# Patient Record
Sex: MALE | Race: ASIAN | NOT HISPANIC OR LATINO | Employment: FULL TIME | URBAN - METROPOLITAN AREA
[De-identification: names, ages, dates, MRNs, and addresses within clinical notes are randomized per-mention and may not be internally consistent; named-entity substitution may affect disease eponyms.]

---

## 2024-01-02 ENCOUNTER — HOSPITAL ENCOUNTER (EMERGENCY)
Facility: CLINIC | Age: 24
Discharge: HOME | End: 2024-01-02
Attending: EMERGENCY MEDICINE | Admitting: EMERGENCY MEDICINE
Payer: OTHER MISCELLANEOUS

## 2024-01-02 ENCOUNTER — APPOINTMENT (OUTPATIENT)
Dept: GENERAL RADIOLOGY | Facility: CLINIC | Age: 24
End: 2024-01-02
Attending: EMERGENCY MEDICINE
Payer: OTHER MISCELLANEOUS

## 2024-01-02 VITALS
TEMPERATURE: 97.7 F | RESPIRATION RATE: 16 BRPM | HEART RATE: 74 BPM | OXYGEN SATURATION: 95 % | DIASTOLIC BLOOD PRESSURE: 70 MMHG | WEIGHT: 190 LBS | SYSTOLIC BLOOD PRESSURE: 117 MMHG

## 2024-01-02 DIAGNOSIS — M54.50 PAIN IN RIGHT LUMBAR REGION OF BACK: ICD-10-CM

## 2024-01-02 PROCEDURE — 96372 THER/PROPH/DIAG INJ SC/IM: CPT | Performed by: EMERGENCY MEDICINE

## 2024-01-02 PROCEDURE — 72100 X-RAY EXAM L-S SPINE 2/3 VWS: CPT

## 2024-01-02 PROCEDURE — 250N000011 HC RX IP 250 OP 636: Performed by: EMERGENCY MEDICINE

## 2024-01-02 PROCEDURE — 250N000013 HC RX MED GY IP 250 OP 250 PS 637: Performed by: EMERGENCY MEDICINE

## 2024-01-02 PROCEDURE — 99284 EMERGENCY DEPT VISIT MOD MDM: CPT

## 2024-01-02 PROCEDURE — 99284 EMERGENCY DEPT VISIT MOD MDM: CPT | Performed by: EMERGENCY MEDICINE

## 2024-01-02 RX ORDER — OXYCODONE AND ACETAMINOPHEN 5; 325 MG/1; MG/1
1 TABLET ORAL ONCE
Status: COMPLETED | OUTPATIENT
Start: 2024-01-02 | End: 2024-01-02

## 2024-01-02 RX ORDER — KETOROLAC TROMETHAMINE 15 MG/ML
15 INJECTION, SOLUTION INTRAMUSCULAR; INTRAVENOUS ONCE
Status: COMPLETED | OUTPATIENT
Start: 2024-01-02 | End: 2024-01-02

## 2024-01-02 RX ORDER — CYCLOBENZAPRINE HCL 10 MG
10 TABLET ORAL 3 TIMES DAILY PRN
Qty: 15 TABLET | Refills: 0 | Status: SHIPPED | OUTPATIENT
Start: 2024-01-02

## 2024-01-02 RX ORDER — DIAZEPAM 5 MG
5 TABLET ORAL ONCE
Status: COMPLETED | OUTPATIENT
Start: 2024-01-02 | End: 2024-01-02

## 2024-01-02 RX ORDER — OXYCODONE AND ACETAMINOPHEN 5; 325 MG/1; MG/1
1 TABLET ORAL EVERY 6 HOURS PRN
Qty: 6 TABLET | Refills: 0 | Status: SHIPPED | OUTPATIENT
Start: 2024-01-02

## 2024-01-02 RX ADMIN — DIAZEPAM 5 MG: 5 TABLET ORAL at 20:56

## 2024-01-02 RX ADMIN — OXYCODONE HYDROCHLORIDE AND ACETAMINOPHEN 1 TABLET: 5; 325 TABLET ORAL at 20:56

## 2024-01-02 RX ADMIN — KETOROLAC TROMETHAMINE 15 MG: 15 INJECTION, SOLUTION INTRAMUSCULAR; INTRAVENOUS at 20:56

## 2024-01-02 ASSESSMENT — ACTIVITIES OF DAILY LIVING (ADL): ADLS_ACUITY_SCORE: 35

## 2024-01-02 NOTE — ED TRIAGE NOTES
Here with left lower back pain that intermittently wraps around to belly. Started after lifting things at work today, endorses intermittent tingling to thigh, no change in bowel or bladder or hx of back surgeries.      Triage Assessment (Adult)       Row Name 01/02/24 1852          Triage Assessment    Airway WDL WDL        Respiratory WDL    Respiratory WDL WDL        Skin Circulation/Temperature WDL    Skin Circulation/Temperature WDL WDL        Cardiac WDL    Cardiac WDL WDL        Peripheral/Neurovascular WDL    Peripheral Neurovascular WDL WDL        Cognitive/Neuro/Behavioral WDL    Cognitive/Neuro/Behavioral WDL WDL

## 2024-01-02 NOTE — Clinical Note
Orville Koroma was seen and treated in our emergency department on 1/2/2024.  He may return to work on 01/05/2024.       If you have any questions or concerns, please don't hesitate to call.      Giovani Bowens MD

## 2024-01-03 NOTE — ED NOTES
"Pt states he felt a \"sharp pain in low middle of my back after I lifted something heavy.\"  Pain has been constant but worse when lifting.    "

## 2024-01-03 NOTE — ED PROVIDER NOTES
History     Chief Complaint   Patient presents with    Back Pain     HPI  Orville Koroma is a 23 year old male without significant past medical history who presents the emergency department complaining of right-sided lower lumbar back pain radiating to the spine.  Patient was at work and to be lifting 7 heavy doors and had sudden onset pain in the lower right lumbar back region does somewhat go to his buttock but does not go down his leg and also is present in the lower lumbar spine.  He denies any bowel or bladder dysfunction has not had any numbness weakness in his lower extremities.  No history of back pain.  Did not hit his head denies any neck or upper back pain.  Currently rates his pain a 5 out of 10 worsens with movement.    Allergies:  No Known Allergies    Problem List:    There are no problems to display for this patient.       Past Medical History:    No past medical history on file.    Past Surgical History:    No past surgical history on file.    Family History:    No family history on file.    Social History:  Marital Status:  Single [1]        Medications:    No current outpatient medications on file.        Review of Systems  As per HPI.  Physical Exam   BP: 127/83  Pulse: 74  Temp: 97.7  F (36.5  C)  Resp: 16  Weight: 86.2 kg (190 lb)  SpO2: 97 %      Physical Exam  Vitals and nursing note reviewed.   Constitutional:       Appearance: Normal appearance.      Comments: Mild distress secondary to back pain.   HENT:      Head: Normocephalic and atraumatic.      Nose: Nose normal.      Mouth/Throat:      Mouth: Mucous membranes are moist.      Pharynx: Oropharynx is clear.   Eyes:      Conjunctiva/sclera: Conjunctivae normal.   Cardiovascular:      Pulses: Normal pulses.      Heart sounds: Normal heart sounds.   Pulmonary:      Effort: Pulmonary effort is normal.   Abdominal:      General: Abdomen is flat. Bowel sounds are normal. There is no distension.      Palpations: Abdomen is soft.       Tenderness: There is no abdominal tenderness. There is no right CVA tenderness or left CVA tenderness.   Musculoskeletal:      Cervical back: Normal range of motion and neck supple.      Comments: There is no thoracic back tenderness there is mild lower lumbar tenderness with midline present along with tenderness to palpation of the paraspinous muscles of the right lower back region.  No erythema edema present no SI joint tenderness is present.  There is pain with leg raise on right.  Pulses sensation and strength are symmetrical in lower extremities with good plantarflexion and dorsiflexion ankles bilaterally.  Capillary refill present.   Skin:     General: Skin is warm and dry.      Capillary Refill: Capillary refill takes less than 2 seconds.      Findings: No rash.   Neurological:      General: No focal deficit present.      Mental Status: He is alert and oriented to person, place, and time.      Sensory: No sensory deficit.      Motor: No weakness.      Coordination: Coordination normal.   Psychiatric:         Mood and Affect: Mood normal.         ED Course                 Procedures              Critical Care time:  none               No results found for this or any previous visit (from the past 24 hour(s)).    Medications   ketorolac (TORADOL) injection 15 mg (has no administration in time range)   oxyCODONE-acetaminophen (PERCOCET) 5-325 MG per tablet 1 tablet (has no administration in time range)   diazepam (VALIUM) tablet 5 mg (has no administration in time range)       Assessments & Plan (with Medical Decision Making) records were reviewed.  Past medical history medications allergies were reviewed.  Patient has no recent clinical or urgent care or ED visits in our system.  Patient was given oral Valium Percocet and Toradol IM.  X-ray lumbar spine was obtained.  I independently reviewed and interpreted this and agree with radiologist findings of no fracture or subluxation with preserved intervertebral  disc heights.  Patient was observed for some time with improvement in his lower back pain.  Able to move it freely.  Will have patient take ibuprofen for pain for inflammation along with ice tonight and heat tomorrow can try Salonpas patch will give him a few pain pills and muscle relaxants.  If any worsening pain in the back numbness weakness bowel or bladder dysfunction or other symptoms present patient should return for recheck and possible MRI scan.  Patient and father agree with this plan.     I have reviewed the nursing notes.    I have reviewed the findings, diagnosis, plan and need for follow up with the patient.             Discharge Medication List as of 1/2/2024 10:40 PM        START taking these medications    Details   cyclobenzaprine (FLEXERIL) 10 MG tablet Take 1 tablet (10 mg) by mouth 3 times daily as needed for muscle spasms, Disp-15 tablet, R-0, [facility]      oxyCODONE-acetaminophen (PERCOCET) 5-325 MG tablet Take 1 tablet by mouth every 6 hours as needed for severe pain, Disp-6 tablet, R-0, [facility]             Final diagnoses:   Pain in right lumbar region of back       1/2/2024   [facility] EMERGENCY DEPT       Giovani Bowens MD  01/03/24 1358

## 2024-01-03 NOTE — DISCHARGE INSTRUCTIONS
Return if symptoms worsen or new symptoms develop.  Drink plenty of fluids.  Take ibuprofen or Tylenol.  Pain take Percocet for severe pain.  Take Flexeril for muscle relaxant.  Try ice tonight and heat tomorrow if increased numbness weakness bowel or bladder dysfunction please return for recheck.  Try lidocaine patches as needed.

## 2025-02-10 ENCOUNTER — HOSPITAL ENCOUNTER (EMERGENCY)
Facility: CLINIC | Age: 25
Discharge: HOME | End: 2025-02-10
Payer: COMMERCIAL

## 2025-02-10 VITALS
DIASTOLIC BLOOD PRESSURE: 84 MMHG | TEMPERATURE: 97.4 F | OXYGEN SATURATION: 97 % | HEART RATE: 71 BPM | SYSTOLIC BLOOD PRESSURE: 135 MMHG | RESPIRATION RATE: 18 BRPM

## 2025-02-10 DIAGNOSIS — R55 VASOVAGAL SYNCOPE: ICD-10-CM

## 2025-02-10 DIAGNOSIS — S09.90XA CLOSED HEAD INJURY, INITIAL ENCOUNTER: ICD-10-CM

## 2025-02-10 DIAGNOSIS — S69.92XA INJURY OF FINGER OF LEFT HAND, INITIAL ENCOUNTER: ICD-10-CM

## 2025-02-10 PROCEDURE — 99283 EMERGENCY DEPT VISIT LOW MDM: CPT | Mod: 25

## 2025-02-10 PROCEDURE — 99284 EMERGENCY DEPT VISIT MOD MDM: CPT

## 2025-02-10 PROCEDURE — 250N000011 HC RX IP 250 OP 636

## 2025-02-10 PROCEDURE — 90715 TDAP VACCINE 7 YRS/> IM: CPT

## 2025-02-10 PROCEDURE — 90471 IMMUNIZATION ADMIN: CPT

## 2025-02-10 RX ADMIN — CLOSTRIDIUM TETANI TOXOID ANTIGEN (FORMALDEHYDE INACTIVATED), CORYNEBACTERIUM DIPHTHERIAE TOXOID ANTIGEN (FORMALDEHYDE INACTIVATED), BORDETELLA PERTUSSIS TOXOID ANTIGEN (GLUTARALDEHYDE INACTIVATED), BORDETELLA PERTUSSIS FILAMENTOUS HEMAGGLUTININ ANTIGEN (FORMALDEHYDE INACTIVATED), BORDETELLA PERTUSSIS PERTACTIN ANTIGEN, AND BORDETELLA PERTUSSIS FIMBRIAE 2/3 ANTIGEN 0.5 ML: 5; 2; 2.5; 5; 3; 5 INJECTION, SUSPENSION INTRAMUSCULAR at 19:26

## 2025-02-10 ASSESSMENT — ACTIVITIES OF DAILY LIVING (ADL): ADLS_ACUITY_SCORE: 41

## 2025-02-10 NOTE — Clinical Note
Franci was seen and treated in our emergency department on 2/10/2025.  He may return to school on 02/17/2025.  Orville Koroma cannot return to school until 2/17/25 due to a head injury.    If you have any questions or concerns, please don't hesitate to call.      Bonnie Goodrich PA-C

## 2025-02-11 NOTE — ED TRIAGE NOTES
At school fell and hit head on floor, did have LOC. Denies nausea and vomiting, has had a headache since.

## 2025-02-11 NOTE — DISCHARGE INSTRUCTIONS
You likely have a concussion from hitting your head. These can cause several symptoms including headache, eye strain, fatigue, difficulty concentrating, nausea, irritability. They can take several weeks to months to recover from. Avoid any activities (watching TV, reading, school related activities) that worsen your symptoms. It is important to rest for the first couple days after an injury to allow your brain time to recover.     For headache you can take Ibuprofen 600mg and/or Tylenol 500-1000mg every 6 hours as needed.     You can schedule a follow up appointment with our concussion clinic if symptoms are not improving.     Return to the ER if you develop severe headache, vision changes, vomiting, confusion/disorientation, another episode where you pass out, or if other concerning symptoms develop.    You should wash the wound twice a day with soap and water. Dry it well. Keep it covered with a bandage for the first 48-72 hours, or whenever there is a chance for a dirty exposure.     Do not go swimming or submerge the wound in any water as this can cause infection.    Monitor for signs of infection such as increasing pain, redness, swelling, drainage of pus, fever. If these or other concerning symptoms develop you should return to the ER for evaluation.

## 2025-02-11 NOTE — ED PROVIDER NOTES
History     Chief Complaint   Patient presents with    Concussion       Orville Koroma is a 25 year old male with no significant pmhx who presents for evaluation of head injury.  Patient states he was cutting a potato in class today when he accidentally cut the tip of his left finger.  Immediately after the injury and looking at his bleeding finger, he developed lightheadedness and felt his vision go black.  He reportedly lost consciousness and fell to the ground hitting his head.  When he came to he initially felt briefly disoriented, but denies any prolonged confusion.  This event occurred around noon (7 hours prior to arrival).  Since passing out and hitting his head he has had a generalized mild headache.  He denies any further periods of disorientation or confusion, nausea/vomiting, vision changes.  He is not on a blood thinner medication.  He has never suddenly passed out before, and he denies feeling any chest pain with this event.  Prior to cutting his finger he felt completely normal and did not have any signs or symptoms of passing out.  He was able to wash the wound and get the bleeding to stop.    Allergies:  No Known Allergies    Problem List:    There are no active problems to display for this patient.       Past Medical History:    No past medical history on file.    Past Surgical History:    No past surgical history on file.    Family History:    No family history on file.    Social History:  Marital Status:  Single [1]        Medications:    cyclobenzaprine (FLEXERIL) 10 MG tablet  oxyCODONE-acetaminophen (PERCOCET) 5-325 MG tablet          Physical Exam   BP: 135/84  Pulse: 71  Temp: 97.4  F (36.3  C)  Resp: 18  SpO2: 97 %      Physical Exam  Vitals and nursing note reviewed.   Constitutional:       General: He is not in acute distress.     Appearance: He is not ill-appearing, toxic-appearing or diaphoretic.   HENT:      Head: Normocephalic and atraumatic. No raccoon eyes or Powers's sign.      Right  Ear: Tympanic membrane normal. No hemotympanum.      Left Ear: Tympanic membrane normal. No hemotympanum.      Nose: Nose normal.      Mouth/Throat:      Mouth: Mucous membranes are moist.      Pharynx: Oropharynx is clear.   Eyes:      Extraocular Movements: Extraocular movements intact.      Conjunctiva/sclera: Conjunctivae normal.      Pupils: Pupils are equal, round, and reactive to light.   Cardiovascular:      Rate and Rhythm: Normal rate and regular rhythm.      Pulses: Normal pulses.   Pulmonary:      Effort: Pulmonary effort is normal.   Musculoskeletal:         General: Normal range of motion.      Cervical back: Normal range of motion. No rigidity or tenderness.      Comments: Left index finger with a injury to the distal nail.  There is a section of the nail that has been cut out with exposed underlying nailbed.  No bleeding.  No laceration of the nailbed.  Neurovascular examination intact.  Finger with full range of motion.   Skin:     General: Skin is warm.   Neurological:      General: No focal deficit present.      Mental Status: He is alert and oriented to person, place, and time.      Cranial Nerves: No cranial nerve deficit.      Sensory: No sensory deficit.      Motor: No weakness.      Coordination: Coordination normal.   Psychiatric:         Mood and Affect: Mood normal.         Behavior: Behavior normal.         ED Course        Procedures                    No results found for this or any previous visit (from the past 24 hours).    Medications   Tdap (tetanus-diphtheria-acell pertussis) (ADACEL) injection 0.5 mL (0.5 mLs Intramuscular $Given 2/10/25 1926)       Assessments & Plan (with Medical Decision Making)     I have reviewed the nursing notes.    I have reviewed the findings, diagnosis, plan and need for follow up with the patient.    Medical Decision Making  Orville Koroma is a 25 year old male with no significant pmhx who presents for evaluation of head injury.  Differential diagnoses  include vasovagal syncope, cardiac cause of syncope, concussion, ICH, skull fracture.  Vital signs within normal limits.  Patient is normotensive, afebrile, he is not tachycardic, and he is satting 97% on room air with a regular respiratory rate and effort.    On examination patient is well-appearing, alert, oriented, no acute distress.  Cranial nerves are grossly intact.  Coordination with finger-nose-finger testing is intact.  Bilateral ear exam negative for hemotympanum.  No raccoon eyes or Powers sign.  Low suspicion for basilar skull fracture given these negative physical exam findings.  Eyes PERRLA, EOMs intact.  Patient denies vision changes or photophobia.  No obvious external injury to the head or scalp.  Neck with full active nontender range of motion.  Considered CT head imaging to evaluate for clinically significant TBI, but patient did not experience loss of consciousness as a result of hitting his head, and he has not had vomiting, vision changes, low GCS, or other red flag signs concerning for ICH.  He has remained stable without change in the symptoms since the injury initially occurred 7 hours ago.  He does not meet [place] head CT criteria.  Also considered further workup for his syncopal episode.  However, I suspect that this is vasovagal as it occurred immediately after accidentally cutting his finger and seeing the injury.  He had presyncopal symptoms of feeling lightheaded with edges of his vision going black.  He has not had prior episodes of syncope with unknown cause or presyncopal symptoms.  Low suspicion for cardiac cause or other pathology as a source of his syncopal episode.  He does have evidence of a nail injury to the distal left second digit with part of the distal nail removed and the underlying nailbed exposed.  No laceration of the nailbed.  No active bleeding.  Full range of motion of the digit, and intact neurovascular examination.    Patient's Tdap was updated today as it was  last administered in 2018.  We discussed proper wound care for his injury.  We also discussed that he likely has a concussion from hitting his head as a cause of his persistent headache.  We discussed general concussion precautions and care, and referral was placed to the concussion clinic for follow-up.  We discussed strict return precautions should he have another syncopal episode without presyncopal symptoms or trigger, or if you develop severe headache, vision changes, vomiting, disorientation/confusion, signs of infection in the finger, or if other concerning symptoms develop.  Patient voiced understanding of the plan and had no further questions.      New Prescriptions    No medications on file       Final diagnoses:   Vasovagal syncope   Closed head injury, initial encounter   Injury of finger of left hand, initial encounter       Bonnie Goodrich PA-C  February 10, 2025  [facility] EMERGENCY DEPT     Bonnie Goodrich PA-C  02/10/25 2025

## 2025-02-11 NOTE — ED PROVIDER NOTES
/84   Pulse 71   Temp 97.4  F (36.3  C) (Oral)   Resp 18   SpO2 97%     Orville Koroma is a 25-year-old male presenting with complaints of head injury today while at school this morning.  Patient states he cut his finger and subsequently had a syncopal episode.  Had loss of consciousness.  Patient has had ongoing headache and dizziness since.  No vomiting.  He does have a hematoma to the right side of his head.  Not on any daily medications.  Given patient's history, I recommended he be evaluated in the emergency department.  Patient expresses understanding of this and agreement with the plan.       Di London PA-C  02/10/25 [id number]

## 2025-04-06 ENCOUNTER — HEALTH MAINTENANCE LETTER (OUTPATIENT)
Age: 25
End: 2025-04-06